# Patient Record
Sex: FEMALE | Race: OTHER | Employment: FULL TIME | URBAN - METROPOLITAN AREA
[De-identification: names, ages, dates, MRNs, and addresses within clinical notes are randomized per-mention and may not be internally consistent; named-entity substitution may affect disease eponyms.]

---

## 2019-01-11 ENCOUNTER — OFFICE VISIT (OUTPATIENT)
Dept: OBGYN CLINIC | Age: 34
End: 2019-01-11

## 2019-01-11 ENCOUNTER — HOSPITAL ENCOUNTER (OUTPATIENT)
Dept: LAB | Age: 34
Discharge: HOME | End: 2019-01-11
Payer: COMMERCIAL

## 2019-01-11 VITALS
BODY MASS INDEX: 31.55 KG/M2 | SYSTOLIC BLOOD PRESSURE: 97 MMHG | HEIGHT: 67 IN | DIASTOLIC BLOOD PRESSURE: 63 MMHG | WEIGHT: 201 LBS | HEART RATE: 84 BPM | TEMPERATURE: 99.4 F | RESPIRATION RATE: 18 BRPM

## 2019-01-11 DIAGNOSIS — Z01.419 ENCOUNTER FOR GYNECOLOGICAL EXAMINATION WITHOUT ABNORMAL FINDING: Primary | ICD-10-CM

## 2019-01-11 PROCEDURE — 87624 HPV HI-RISK TYP POOLED RSLT: CPT

## 2019-01-11 PROCEDURE — 88175 CYTOPATH C/V AUTO FLUID REDO: CPT

## 2019-01-11 NOTE — PROGRESS NOTES
Subjective:   35 y.o. female for Well Woman Check. No LMP recorded. Social History: single partner, contraception - vasectomy. Pertinent past medical hstory: no history of HTN, DVT, CAD, DM, liver disease, migraines or smoking. No Known Allergies  History reviewed. No pertinent past medical history. History reviewed. No pertinent surgical history. History reviewed. No pertinent family history. Social History     Tobacco Use    Smoking status: Never Smoker   Substance Use Topics    Alcohol use: No        ROS:  Feeling well. No dyspnea or chest pain on exertion. No abdominal pain, change in bowel habits, black or bloody stools. No urinary tract symptoms. GYN ROS: normal menses, no abnormal bleeding, pelvic pain or discharge, no breast pain or new or enlarging lumps on self exam. No neurological complaints. Objective: There were no vitals taken for this visit. The patient appears well, alert, oriented x 3, in no distress. ENT normal.  Neck supple. No adenopathy or thyromegaly. DELILAH. Lungs are clear, good air entry, no wheezes, rhonchi or rales. S1 and S2 normal, no murmurs, regular rate and rhythm. Abdomen soft without tenderness, guarding, mass or organomegaly. Extremities show no edema, normal peripheral pulses. Neurological is normal, no focal findings. BREAST EXAM: breasts appear normal, no suspicious masses, no skin or nipple changes or axillary nodes    PELVIC EXAM: normal external genitalia, vulva, vagina, cervix, uterus and adnexa, PAP: Pap smear done today, HPV test    Assessment/Plan:   well woman  pap smear  Follow up for Nexplanon removal  Plan of care discussed. Patient expressed understanding.

## 2019-08-23 ENCOUNTER — OFFICE VISIT (OUTPATIENT)
Dept: OBGYN CLINIC | Age: 34
End: 2019-08-23

## 2019-08-23 VITALS
WEIGHT: 197 LBS | RESPIRATION RATE: 18 BRPM | DIASTOLIC BLOOD PRESSURE: 64 MMHG | HEIGHT: 67 IN | SYSTOLIC BLOOD PRESSURE: 104 MMHG | BODY MASS INDEX: 30.92 KG/M2 | HEART RATE: 68 BPM

## 2019-08-23 DIAGNOSIS — Z30.09 ENCOUNTER FOR OTHER GENERAL COUNSELING OR ADVICE ON CONTRACEPTION: Primary | ICD-10-CM

## 2019-08-23 RX ORDER — MEDROXYPROGESTERONE ACETATE 150 MG/ML
150 INJECTION, SUSPENSION INTRAMUSCULAR ONCE
Qty: 1 ML | Refills: 3 | Status: SHIPPED | OUTPATIENT
Start: 2019-08-23 | End: 2019-08-23

## 2019-08-23 NOTE — PROGRESS NOTES
Subjective:      Patient presents for a consult regarding removal of her Nexplanon, which has been in place for more than 3 years. She also wants to discuss other forms of contraception for control of her periods. She has no complaints. No Known Allergies  History reviewed. No pertinent past medical history. Past Surgical History:   Procedure Laterality Date    HX REFRACTIVE SURGERY       History reviewed. No pertinent family history. Social History     Tobacco Use    Smoking status: Never Smoker   Substance Use Topics    Alcohol use: No      Review of Systems    A comprehensive review of systems was negative except for that written in the HPI. Objective:     Visit Vitals  /64 (BP 1 Location: Left arm, BP Patient Position: Sitting)   Pulse 68   Resp 18   Ht 5' 7\" (1.702 m)   Wt 197 lb (89.4 kg)   LMP 08/14/2019   BMI 30.85 kg/m²     General appearance: alert, cooperative, no distress, appears stated age  Exam deferred. Assessment/Plan:     Contraceptive counseling. Patient to make an appointment for Nexplanon removal.  Discussed other forms of contraception along with their risks and benefits. Patient would like to receive Depo-Provera. Will send Rx to her pharmacy to be brought to her next appointment. Plan of care discussed. Patient expressed understanding. The entirety of the 15 minute visit was spent in counseling.

## 2019-09-20 ENCOUNTER — OFFICE VISIT (OUTPATIENT)
Dept: OBGYN CLINIC | Age: 34
End: 2019-09-20

## 2019-09-20 VITALS
BODY MASS INDEX: 31.39 KG/M2 | SYSTOLIC BLOOD PRESSURE: 104 MMHG | HEIGHT: 67 IN | HEART RATE: 79 BPM | WEIGHT: 200 LBS | DIASTOLIC BLOOD PRESSURE: 70 MMHG | RESPIRATION RATE: 18 BRPM

## 2019-09-20 DIAGNOSIS — Z30.46 ENCOUNTER FOR NEXPLANON REMOVAL: Primary | ICD-10-CM

## 2019-09-20 LAB
HCG URINE, QL. (POC): NEGATIVE
VALID INTERNAL CONTROL?: YES

## 2019-09-20 NOTE — PROGRESS NOTES
Ron Walls is a 29 y.o. female who presents for depo injection. After pt's IDs were confirmed with depo medication, she was given depo IM injection to her rt buttocks without any distress noted. She denies any symptoms , reactions or allergies that would exclude her from being immunized today. Risks and adverse reactions were discussed and the UPT negative. All questions were addressed. She was observed for 20 min post injection. There were no reactions observed. She is aware of next injection is due in 12/2019.     Evie Das LPN

## 2019-09-20 NOTE — PROCEDURES
[facility]  OFFICE PROCEDURE PROGRESS NOTE        Chart reviewed for the following:   Henrique TILLMAN DO, have reviewed the History, Physical and updated the Allergic reactions for [address] performed immediately prior to start of procedure:   Henrique TILLMAN DO, have performed the following reviews on [facility] prior to the start of the procedure:            * Patient was identified by name and date of birth   * Agreement on procedure being performed was verified  * Risks and Benefits explained to the patient  * Procedure site verified and marked as necessary  * Patient was positioned for comfort  * Consent was signed and verified     Time: 2814    Date of procedure: 9/20/2019    Procedure performed by:  Henrique Hernandez DO    Provider assisted by: Alvarez Matias LPN    Patient assisted by: self    How tolerated by patient: tolerated the procedure well with no complications    Post Procedural Pain Scale: 0 - No Hurt    Comments: none    Procedure note, Nexplanon removal:  All questions were answered and written consent obtained. The patient was placed in a supine position and the implant palpated in the left upper arm. The skin over the distal end of the implant was swabbed with Betadine and local anesthesia obtained with 1 ml of 1% lidocaine with epinephrine. A 1 cm skin incision was made over the distal end of the implant. The implant was grasped with forceps and withdrawn without difficulty. The incision was closed with a single stitch of 4-0 Monocryl. A dressing was applied. There were no complications and the patient tolerated the procedure well.

## 2019-09-20 NOTE — PROGRESS NOTES
Patient here for Nexplanon removal and Depo-Provera injection only, no complaints. See procedure note.